# Patient Record
Sex: FEMALE | Race: WHITE | Employment: FULL TIME | URBAN - METROPOLITAN AREA
[De-identification: names, ages, dates, MRNs, and addresses within clinical notes are randomized per-mention and may not be internally consistent; named-entity substitution may affect disease eponyms.]

---

## 2021-05-29 ENCOUNTER — OFFICE VISIT (OUTPATIENT)
Dept: FAMILY MEDICINE | Facility: CLINIC | Age: 41
End: 2021-05-29
Payer: COMMERCIAL

## 2021-05-29 VITALS
RESPIRATION RATE: 14 BRPM | DIASTOLIC BLOOD PRESSURE: 72 MMHG | OXYGEN SATURATION: 98 % | HEIGHT: 66 IN | WEIGHT: 121 LBS | SYSTOLIC BLOOD PRESSURE: 111 MMHG | HEART RATE: 86 BPM | BODY MASS INDEX: 19.44 KG/M2 | TEMPERATURE: 98.1 F

## 2021-05-29 DIAGNOSIS — S63.501A WRIST SPRAIN, RIGHT, INITIAL ENCOUNTER: Primary | ICD-10-CM

## 2021-05-29 DIAGNOSIS — M25.531 RIGHT WRIST PAIN: ICD-10-CM

## 2021-05-29 PROCEDURE — 99203 OFFICE O/P NEW LOW 30 MIN: CPT

## 2021-05-29 RX ORDER — VITAMIN B COMPLEX
1 CAPSULE ORAL
COMMUNITY

## 2021-05-29 RX ORDER — LORAZEPAM 0.5 MG/1
.25-.5 TABLET ORAL
COMMUNITY
Start: 2021-05-05 | End: 2022-05-05

## 2021-05-29 RX ORDER — UBIDECARENONE 100 MG
100 CAPSULE ORAL
COMMUNITY

## 2021-05-29 RX ORDER — LEVOTHYROXINE SODIUM 25 UG/1
25 TABLET ORAL
COMMUNITY
Start: 2020-12-31 | End: 2021-12-31

## 2021-05-29 ASSESSMENT — MIFFLIN-ST. JEOR: SCORE: 1225.6

## 2021-05-29 NOTE — PROGRESS NOTES
Assessment & Plan   No charge for this visit please if she ends up going to [facility] for xrays.  Wrist sprain, right, initial encounter  **  - Wrist/Arm/Hand Supplies Order for DME - ONLY FOR DME    Right wrist pain  **  - Wrist/Arm/Hand Supplies Order for DME - ONLY FOR DME       Encouraged patient to go to [facility] for wrist xray, patient states that they have a lot of things planned this evening and won't be able to make it there until 8/8:30p.   Thumb spica wrist splint applied with adequate pain relief.  See patient instructions      Diagnosis and treatment plan were discussed with patient and/or parent. If symptoms worsen or do not improve in the next few days, follow-up with your primary care provider or visit an [facility] urgent care clinic location.  Patient verbalizes understanding of all things discussed. All questions were addressed and answered.     Jessica Ramirez PA-C    [facility]    Luis Felipe Palmer is a 41 year old female who presents to clinic today for the following health issues:  Chief Complaint   Patient presents with     Trauma     Right wrist fell on it last night. painful, swollen, red, numb.     HPI    Was out last night and she fell off stage onto her Right wrist. She was under the influence of alcohol at the time. She is having some numbness in her fingers and pain that radiates from her wrist into her arm.  Fell onto her right side and has right ankle pain.  Hit her head but denies LOC. No headache now.       Has sprained her wrist before when she was in dance as a child. This injury feels worse.     Visiting from [facility].     Tattoo removed right wrist-scar present.     Review of Systems  Constitutional, HEENT, cardiovascular, pulmonary, gi and gu systems are negative, except as otherwise noted.      Objective    /72   Pulse 86    "Temp 98.1  F (36.7  C) (Oral)   Resp 14   Ht 1.676 m (5' 6\")   Wt 54.9 kg (121 lb)   SpO2 98%   BMI 19.53 kg/m    Physical Exam   GENERAL: healthy, alert and no distress  NECK: no adenopathy, no asymmetry, masses, or scars   MS: decreased range of motion wrist flexion, mild edema to ulnar carpal area, multiple small 1-2mm abrasions noted on right wrist where pt fell last night. and Rwrist exam shows moderate tenderness to lunate/triquetrium area, altered sensation ulnar forearm and wrist to light touch. Cap refill <2 sec.        "

## 2021-05-29 NOTE — PATIENT INSTRUCTIONS
Please go to the [facility] location for [facility] wrist xray.    [address] [address]    Patient Education     Wrist Splint: Velcro  A splint is designed to prevent movement of the bones, muscles and tendons. Velcro wrist splints are used because of their comfort and convenience for wrist and hand injuries. In certain conditions, the splint can be removed when bathing or changing clothes. The condition you are being treated for will determine how long you should wear the splint and if it is safe to remove your splint before your next visit. If you are unsure, ask your nurse or doctor.  When to seek medical advice  Call your healthcare provider right away if any of these occur:    Increased pain or swelling under the splint or in the hand or fingers    Fingers or hand becomes cold, blue, numb or tingly   Kaylan last reviewed this educational content on 5/1/2018 2000-2021 [employer]. All rights reserved. This information is not intended as a substitute for professional medical care. Always follow your healthcare professional's instructions.